# Patient Record
Sex: FEMALE | Race: WHITE | URBAN - METROPOLITAN AREA
[De-identification: names, ages, dates, MRNs, and addresses within clinical notes are randomized per-mention and may not be internally consistent; named-entity substitution may affect disease eponyms.]

---

## 2020-05-08 ENCOUNTER — VIRTUAL VISIT (OUTPATIENT)
Dept: FAMILY MEDICINE CLINIC | Age: 35
End: 2020-05-08

## 2020-05-08 DIAGNOSIS — R21 RASH: Primary | ICD-10-CM

## 2020-05-08 RX ORDER — ESCITALOPRAM OXALATE 10 MG/1
TABLET ORAL
COMMUNITY
Start: 2020-02-07 | End: 2020-05-08

## 2020-05-08 RX ORDER — TRIAMCINOLONE ACETONIDE 1 MG/G
CREAM TOPICAL 2 TIMES DAILY
Qty: 15 G | Refills: 0 | Status: SHIPPED | OUTPATIENT
Start: 2020-05-08 | End: 2022-02-17

## 2020-05-08 NOTE — PROGRESS NOTES
[address] Note  HPI:   Sabra Anderson is a 36 y.o. female who presents as a new patient with the following chief complaint. Chief Complaint   Patient presents with    Establish Care    Rash     I was in the office while conducting this encounter. Consent:  She and/or her healthcare decision maker is aware that this patient-initiated Telehealth encounter is a billable service, with coverage as determined by her insurance carrier. She is aware that she may receive a bill and has provided verbal consent to proceed: Yes    This virtual visit was conducted via Smart Cube. Pursuant to the emergency declaration under the [facility], Count includes the [facility] waiver authority and the [employer] Act, this Virtual  Visit was conducted to reduce the patient's risk of exposure to COVID-19 and provide continuity of care for an established patient. Services were provided through a video synchronous discussion virtually to substitute for in-person clinic visit. Due to this being a TeleHealth evaluation, many elements of the physical examination are unable to be assessed. Total Time: minutes: 5-10 minutes. Rash  Patient complains of rash involving the left arm. Rash started 2-3 weeks ago. Appearance of rash at onset: small red bumps. Rash has grown in size. Discomfort associated with rash: no discomfort associated with rash. Denies associated symptoms; Denies fever, cough, congestion, sore throat, headache, arthralgia, abdominal pain, nausea, vomiting, myalgia, crankiness, irritability, decrease in appetite, decrease in energy level. Patient has had previous evaluation of rash. Patient has not had previous treatment. Patient has not had contacts with similar rash. Patient has not identified precipitant. She did wear new metal bracelets a few weeks ago but is unsure if it is related .  Patient has not had new exposures (soaps, lotions, laundry detergents, foods, medications, plants, insects or animals.)      GYN: Gavi Sloan NP at [facility]- UTD on pap. No Known Allergies   There is no problem list on file for this patient. Past Medical History:   Diagnosis Date    Anxiety       Past Surgical History:   Procedure Laterality Date    HX ORTHOPAEDIC        No LMP recorded.    Family History   Problem Relation Age of Onset    Hypertension Mother     Other Mother         benign brain tumor    Hypertension Father     No Known Problems Sister     No Known Problems Brother     No Known Problems Daughter       Social History     Socioeconomic History    Marital status: SINGLE     Spouse name: Not on file    Number of children: Not on file    Years of education: Not on file    Highest education level: Not on file   Occupational History    Not on file   Social Needs    Financial resource strain: Not on file    Food insecurity     Worry: Not on file     Inability: Not on file   [employer] needs     Medical: Not on file     Non-medical: Not on file   Tobacco Use    Smoking status: Former Smoker     Packs/day: 0.25     Years: 2.00     Pack years: 0.50     Types: Cigarettes    Smokeless tobacco: Never Used   Substance and Sexual Activity    Alcohol use: Not Currently    Drug use: Not Currently     Types: Marijuana    Sexual activity: Not on file   Lifestyle    Physical activity     Days per week: Not on file     Minutes per session: Not on file    Stress: Not on file   Relationships    Social connections     Talks on phone: Not on file     Gets together: Not on file     Attends Gnosticism service: Not on file     Active member of club or organization: Not on file     Attends meetings of clubs or organizations: Not on file     Relationship status: Not on file    Intimate partner violence     Fear of current or ex partner: Not on file     Emotionally abused: Not on file     Physically abused: Not on file     Forced sexual activity: Not on file   Other Topics Concern    Not on file   Social History Narrative    Lives at home with daughter    Working at [employer]        ROS:   Review of Systems   Constitutional: Negative for chills, diaphoresis, fever, malaise/fatigue and weight loss. HENT: Negative for congestion, ear pain, hearing loss, sinus pain, sore throat and tinnitus. Eyes: Negative for blurred vision and double vision. Respiratory: Negative for shortness of breath. Cardiovascular: Negative for chest pain, palpitations and leg swelling. Gastrointestinal: Negative for abdominal pain, blood in stool, constipation, diarrhea, heartburn, melena, nausea and vomiting. Genitourinary: Negative for dysuria, frequency, hematuria and urgency. Musculoskeletal: Negative for joint pain and myalgias. Skin: Positive for rash. Negative for itching. Neurological: Negative for dizziness, tingling, weakness and headaches. Endo/Heme/Allergies: Negative for polydipsia. Psychiatric/Behavioral: Negative. Physical Exam:   There were no vitals taken for this visit. Vitals and Nurse Documentation reviewed. Physical Exam  Constitutional:       General: She is not in acute distress. Appearance: Normal appearance. She is well-developed, well-groomed and normal weight. She is not ill-appearing, toxic-appearing or diaphoretic. HENT:      Head: Normocephalic and atraumatic. Right Ear: Hearing normal.      Left Ear: Hearing normal.      Nose: No nasal deformity. Mouth/Throat:      Lips: Pink. No lesions. Mouth: Mucous membranes are moist.   Eyes:      General: Lids are normal.      Conjunctiva/sclera: Conjunctivae normal.      Right eye: Right conjunctiva is not injected. Left eye: Left conjunctiva is not injected. Pulmonary:      Effort: Pulmonary effort is normal.   Skin:         Neurological:      Mental Status: She is alert and oriented to person, place, and time. Psychiatric:         Mood and Affect: Mood normal.         Behavior: Behavior normal. Behavior is cooperative. Thought Content: Thought content normal.         Judgment: Judgment normal.           Assessment/ Plan:   [employer] were discussed including hours of operation, on-call providers, and MyChart. Diagnoses and all orders for this visit:    1. Rash: Small area of concern. Favor contact dermatitis. Start topical steroids. Advised follow-up in 1-2 weeks if no improvement. -     triamcinolone acetonide (KENALOG) 0.1 % topical cream; Apply  to affected area two (2) times a day. use thin layer to left arm    Advised follow-up for CPE at her convenience. Follow-up and Dispositions    · Return if symptoms worsen or fail to improve.         Discussed expected course/resolution/complications of diagnosis in detail with patient.    Medication risks/benefits/costs/interactions/alternatives discussed with patient.    Pt expressed understanding with the diagnosis and plan

## 2022-01-14 ENCOUNTER — OFFICE VISIT (OUTPATIENT)
Dept: INTERNAL MEDICINE CLINIC | Age: 37
End: 2022-01-14
Payer: COMMERCIAL

## 2022-01-14 VITALS
BODY MASS INDEX: 38.25 KG/M2 | RESPIRATION RATE: 16 BRPM | WEIGHT: 238 LBS | DIASTOLIC BLOOD PRESSURE: 90 MMHG | SYSTOLIC BLOOD PRESSURE: 120 MMHG | HEIGHT: 66 IN | TEMPERATURE: 98.2 F | OXYGEN SATURATION: 96 % | HEART RATE: 91 BPM

## 2022-01-14 DIAGNOSIS — N30.01 ACUTE CYSTITIS WITH HEMATURIA: ICD-10-CM

## 2022-01-14 DIAGNOSIS — E78.2 MIXED HYPERLIPIDEMIA: ICD-10-CM

## 2022-01-14 DIAGNOSIS — R03.0 ELEVATED BLOOD PRESSURE READING: Primary | ICD-10-CM

## 2022-01-14 DIAGNOSIS — E66.9 OBESITY (BMI 30-39.9): ICD-10-CM

## 2022-01-14 DIAGNOSIS — R10.13 EPIGASTRIC PAIN: ICD-10-CM

## 2022-01-14 DIAGNOSIS — Z00.00 ENCOUNTER FOR PREVENTIVE CARE: ICD-10-CM

## 2022-01-14 PROCEDURE — 99204 OFFICE O/P NEW MOD 45 MIN: CPT | Performed by: INTERNAL MEDICINE

## 2022-01-14 NOTE — PATIENT INSTRUCTIONS
DASH Diet: Care Instructions  Your Care Instructions     The DASH diet is an eating plan that can help lower your blood pressure. DASH stands for Dietary Approaches to Stop Hypertension. Hypertension is high blood pressure. The DASH diet focuses on eating foods that are high in calcium, potassium, and magnesium. These nutrients can lower blood pressure. The foods that are highest in these nutrients are fruits, vegetables, low-fat dairy products, nuts, seeds, and legumes. But taking calcium, potassium, and magnesium supplements instead of eating foods that are high in those nutrients does not have the same effect. The DASH diet also includes whole grains, fish, and poultry. The DASH diet is one of several lifestyle changes your doctor may recommend to lower your high blood pressure. Your doctor may also want you to decrease the amount of sodium in your diet. Lowering sodium while following the DASH diet can lower blood pressure even further than just the DASH diet alone. Follow-up care is a key part of your treatment and safety. Be sure to make and go to all appointments, and call your doctor if you are having problems. It's also a good idea to know your test results and keep a list of the medicines you take. How can you care for yourself at home? Following the DASH diet  · Eat 4 to 5 servings of fruit each day. A serving is 1 medium-sized piece of fruit, ½ cup chopped or canned fruit, 1/4 cup dried fruit, or 4 ounces (½ cup) of fruit juice. Choose fruit more often than fruit juice. · Eat 4 to 5 servings of vegetables each day. A serving is 1 cup of lettuce or raw leafy vegetables, ½ cup of chopped or cooked vegetables, or 4 ounces (½ cup) of vegetable juice. Choose vegetables more often than vegetable juice. · Get 2 to 3 servings of low-fat and fat-free dairy each day. A serving is 8 ounces of milk, 1 cup of yogurt, or 1 ½ ounces of cheese. · Eat 6 to 8 servings of grains each day.  A serving is 1 slice of bread, 1 ounce of dry cereal, or ½ cup of cooked rice, pasta, or cooked cereal. Try to choose whole-grain products as much as possible. · Limit lean meat, poultry, and fish to 2 servings each day. A serving is 3 ounces, about the size of a deck of cards. · Eat 4 to 5 servings of nuts, seeds, and legumes (cooked dried beans, lentils, and split peas) each week. A serving is 1/3 cup of nuts, 2 tablespoons of seeds, or ½ cup of cooked beans or peas. · Limit fats and oils to 2 to 3 servings each day. A serving is 1 teaspoon of vegetable oil or 2 tablespoons of salad dressing. · Limit sweets and added sugars to 5 servings or less a week. A serving is 1 tablespoon jelly or jam, ½ cup sorbet, or 1 cup of lemonade. · Eat less than 2,300 milligrams (mg) of sodium a day. If you limit your sodium to 1,500 mg a day, you can lower your blood pressure even more. · Be aware that all of these are the suggested number of servings for people who eat 1,800 to 2,000 calories a day. Your recommended number of servings may be different if you need more or fewer calories. Tips for success  · Start small. Do not try to make dramatic changes to your diet all at once. You might feel that you are missing out on your favorite foods and then be more likely to not follow the plan. Make small changes, and stick with them. Once those changes become habit, add a few more changes. · Try some of the following:  ? Make it a goal to eat a fruit or vegetable at every meal and at snacks. This will make it easy to get the recommended amount of fruits and vegetables each day. ? Try yogurt topped with fruit and nuts for a snack or healthy dessert. ? Add lettuce, tomato, cucumber, and onion to sandwiches. ? Combine a ready-made pizza crust with low-fat mozzarella cheese and lots of vegetable toppings. Try using tomatoes, squash, spinach, broccoli, carrots, cauliflower, and onions. ?  Have a variety of cut-up vegetables with a low-fat dip as an appetizer instead of chips and dip. ? Sprinkle sunflower seeds or chopped almonds over salads. Or try adding chopped walnuts or almonds to cooked vegetables. ? Try some vegetarian meals using beans and peas. Add garbanzo or kidney beans to salads. Make burritos and tacos with mashed cox beans or black beans. Where can you learn more? Go to http://www.pederson.com/  Enter H967 in the search box to learn more about \"DASH Diet: Care Instructions. \"  Current as of: April 29, 2021               Content Version: 13.0  © 3278-3313 [employer]. Care instructions adapted under license by [employer] (which disclaims liability or warranty for this information). If you have questions about a medical condition or this instruction, always ask your healthcare professional. [address] any warranty or liability for your use of this information.

## 2022-01-14 NOTE — Clinical Note
Please obtain medical records from [facility] [place]: need last sets of labs, last provider note.

## 2022-01-14 NOTE — PROGRESS NOTES
1. Have you been to the ER, urgent care clinic since your last visit? Hospitalized since your last visit? No    2. Have you seen or consulted any other health care providers outside of the [address] [place] [address] since your last visit? Include any pap smears or colon screening.  No   Chief Complaint   Patient presents with   [address] Maintenance Due   Topic Date Due    Hepatitis C Screening  Never done    COVID-19 Vaccine (1) Never done    DTaP/Tdap/Td series (1 - Tdap) Never done    Cervical cancer screen  Never done    Flu Vaccine (1) Never done

## 2022-01-14 NOTE — PROGRESS NOTES
Danay Camargo is a 29 y.o. female  Chief Complaint   Patient presents with    Establish Care     Visit Vitals  BP (!) 120/90   Pulse 91   Temp 98.2 °F (36.8 °C) (Temporal)   Resp 16   Ht 5' 6\" (1.676 m)   Wt 238 lb (108 kg)   SpO2 96%   BMI 38.41 kg/m²          HPI  Ms. Tom Johnston presents to the clinic to establish care. Her primary concern today is periumblical abdominal discomfort and stool irregularity that started about a month ago. The discomfort is vague/aching and associated with flank pain and triggered by meals and has to fast for over 24 hours to avoid pain. She reports that her stool shape appeared to be irregular shaped with ragged/flat rather than nicely round. She also reports her stool is smells like the canned chicken she ate about a month ago and her daughter had similar symptoms. Patient was seen at ob-gyn office who told her that her triglycerides and insulin levels are elevated and she was started on metformin. She endorses an episode of vomiting recently and she reports intolerance to certain foods and wants to get food allergy testing. Patient also reports halitosis and feeling of food getting stuck in her throat, endorses heat burn. Patient lost about 7 pounds in 2 month intentionally by eating low calorie diet, but hasn't started exercising yet. 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 Smokes 1-2 black and mild a day and plans to quit. Her last pap smear was normal and she had covid vaccine and she will get flue vaccine. Past Medical History:   Diagnosis Date    Anxiety       Allergies   Allergen Reactions    Amoclan [Amoxicillin-Pot Clavulanate] Other (comments)     Yeast infection            ROS  Review of Systems   All other systems reviewed and are negative. EXAM  Physical Exam  Constitutional:       General: She is not in acute distress. Appearance: Normal appearance. HENT:      Head: Normocephalic and atraumatic. Mouth/Throat:      Mouth: Mucous membranes are moist.      Pharynx: Oropharynx is clear. Eyes:      Extraocular Movements: Extraocular movements intact. Cardiovascular:      Rate and Rhythm: Normal rate and regular rhythm. Heart sounds: No murmur heard. No gallop. Pulmonary:      Effort: No respiratory distress. Breath sounds: Normal breath sounds. No wheezing or rales. Abdominal:      General: Bowel sounds are normal. There is no distension. Palpations: There is no mass. Tenderness: There is abdominal tenderness. There is no guarding. Comments: Mild tenderness   Musculoskeletal:         General: No swelling, tenderness or deformity. Normal range of motion. Right lower leg: No edema. Left lower leg: No edema. Skin:     Findings: No lesion. Neurological:      General: No focal deficit present. Mental Status: She is alert and oriented to person, place, and time. Motor: No weakness. Psychiatric:         Mood and Affect: Mood normal.         Health Maintenance Due   Topic Date Due    Hepatitis C Screening  Never done    COVID-19 Vaccine (1) Never done    DTaP/Tdap/Td series (1 - Tdap) Never done    Cervical cancer screen  Never done    Flu Vaccine (1) Never done     ASSESSMENT/PLAN  Diagnoses and all orders for this visit:    1. Elevated blood pressure reading    2. Encounter for preventive care    3. Epigastric pain  -     US ABD LTD; Future  -     OVA & PARASITES, STOOL; Future  -     H PYLORI AG, STOOL; Future  -     URINALYSIS W/ RFLX MICROSCOPIC; Future  -     LIPASE; Future  -     CELIAC ANTIBODY PROFILE; Future  -     ALLERGEN PANEL, FOOD, BASIC; Future    4. Mixed hyperlipidemia  -     LIPID PANEL;  Future      Abdominal pain/food intolerance   -will obtain RUQ ultrasound, cbc, cmp and lipase , H. Pylori, stool ova and parasite.  -will get food allergy and celiac antibody testing      Elevated blood pressure reading   -monitor blood pressure at home   -advised on DASH diet  -regular exercise 50 minutes a day for 3-4 x/week  -low calorie diet for weight loss     Obesity  -patient is seeing a progress in weight loss, lost about 7lbs in a few weeks by adopting low calorie diet and plant based diet and plans to start exercising.   -encouraged exercise   -she is currently on metformin from ob-gyn office   -will get A1c and routine labs and get records from [facility]    HLD  -she was told she has elevated triglyceride but she didn't take it after fasting   -will get fasting lipid profile   -advised on limiting the amount of saturated fats and trans fats  -will advise further after lipid profile     Preventive visit   -has ob-gyn, normal pap smears   -had covid vaccine, to get booster and flue vaccine at her pharmacy   -encouraged exercise and healthy dietary habits        Pablito Rowe MD

## 2022-01-17 RX ORDER — CEFDINIR 300 MG/1
300 CAPSULE ORAL 2 TIMES DAILY
Qty: 10 CAPSULE | Refills: 0 | Status: SHIPPED | OUTPATIENT
Start: 2022-01-17 | End: 2022-01-22

## 2022-01-19 LAB
H PYLORI AG STL QL IA: NEGATIVE
O+P SPEC MICRO: NORMAL
O+P STL CONC: NORMAL
SPECIMEN SOURCE: NORMAL
SPECIMEN SOURCE: NORMAL

## 2022-02-17 ENCOUNTER — OFFICE VISIT (OUTPATIENT)
Dept: INTERNAL MEDICINE CLINIC | Age: 37
End: 2022-02-17
Payer: COMMERCIAL

## 2022-02-17 VITALS
OXYGEN SATURATION: 97 % | RESPIRATION RATE: 20 BRPM | DIASTOLIC BLOOD PRESSURE: 111 MMHG | HEART RATE: 97 BPM | SYSTOLIC BLOOD PRESSURE: 162 MMHG | HEIGHT: 66 IN | BODY MASS INDEX: 38.89 KG/M2 | WEIGHT: 242 LBS | TEMPERATURE: 97.9 F

## 2022-02-17 DIAGNOSIS — R31.9 HEMATURIA, UNSPECIFIED TYPE: ICD-10-CM

## 2022-02-17 DIAGNOSIS — R03.0 ELEVATED BLOOD PRESSURE READING: ICD-10-CM

## 2022-02-17 DIAGNOSIS — R73.09 ELEVATED GLUCOSE: Primary | ICD-10-CM

## 2022-02-17 DIAGNOSIS — R80.9 PROTEINURIA, UNSPECIFIED TYPE: ICD-10-CM

## 2022-02-17 LAB
BILIRUB UR QL STRIP: NEGATIVE
GLUCOSE UR-MCNC: NEGATIVE MG/DL
KETONES P FAST UR STRIP-MCNC: NEGATIVE MG/DL
PH UR STRIP: 5.5 [PH] (ref 4.6–8)
PROT UR QL STRIP: NORMAL
SP GR UR STRIP: 1.03 (ref 1–1.03)
UA UROBILINOGEN AMB POC: NORMAL (ref 0.2–1)
URINALYSIS CLARITY POC: NORMAL
URINALYSIS COLOR POC: NORMAL
URINE BLOOD POC: NORMAL
URINE LEUKOCYTES POC: NEGATIVE
URINE NITRITES POC: NEGATIVE

## 2022-02-17 PROCEDURE — 99213 OFFICE O/P EST LOW 20 MIN: CPT | Performed by: PHYSICIAN ASSISTANT

## 2022-02-17 PROCEDURE — 81002 URINALYSIS NONAUTO W/O SCOPE: CPT | Performed by: PHYSICIAN ASSISTANT

## 2022-02-17 NOTE — PROGRESS NOTES
1. Have you been to the ER, urgent care clinic since your last visit? Hospitalized since your last visit? No    2. Have you seen or consulted any other health care providers outside of the [address] [place] [address] since your last visit? Include any pap smears or colon screening. No      Health Maintenance Due   Topic Date Due    Hepatitis C Screening  Never done    COVID-19 Vaccine (1) Never done    DTaP/Tdap/Td series (1 - Tdap) Never done    Flu Vaccine (1) Never done     Patient having urinary frequency and odor, patient also states having a discharge light yellow, odor since 1/14. Patient was on an antibiotic. Patient states experiencing abdominal/bladder pain due to holding urine, better after voiding.

## 2022-02-17 NOTE — PROGRESS NOTES
Chief Complaint   Patient presents with    Urinary Frequency    Urinary Odor    Yeast Infection     she is a 29y.o. year old female who presents for evaluation. The patient presents for follow up. She states she was recently treated for a uti. At that time she was having rectal type pain and abdominal pian. She was treated for a uti and those symptoms resolved. She reports the only time she has abdominal pain is when her bladder is full. Once she has gone to the bathroom the pain goes away. Reviewed PmHx, RxHx, FmHx, SocHx, AllgHx and updated and dated in the chart. Review of Systems - negative except as listed above    Objective:     Vitals:    02/17/22 0932 02/17/22 1001   BP: (!) 147/107 (!) 162/111   Pulse: 94 97   Resp: 20    Temp: 97.9 °F (36.6 °C)    TempSrc: Temporal    SpO2: 97%    Weight: 242 lb (109.8 kg)    Height: 5' 6\" (1.676 m)      Physical Examination: General appearance - alert, well appearing, and in no distress  Mental status - normal mood, behavior, speech, dress, motor activity, and thought processes  Abdomen - soft, nontender, nondistended, no masses or organomegaly    Assessment/ Plan:   Diagnoses and all orders for this visit:    1. Elevated glucose  -     HEMOGLOBIN A1C WITH EAG; Future  -     CULTURE, URINE; Future  -     AMB POC URINALYSIS DIP STICK MANUAL W/O MICRO    2. Proteinuria, unspecified type  -     METABOLIC PANEL, COMPREHENSIVE; Future  -     CULTURE, URINE; Future  -     AMB POC URINALYSIS DIP STICK MANUAL W/O MICRO    3. Hematuria, unspecified type  -     METABOLIC PANEL, COMPREHENSIVE; Future  -     CULTURE, URINE; Future  -     AMB POC URINALYSIS DIP STICK MANUAL W/O MICRO     I would like for the patient to get some labs done. Today her urine was positive for blood and protein but no leukocytes or nitrates. Her urine will be sent for a culture. Also the patient's blood pressure was elevated.  She is going to get a blood pressure cuff for home and check it daily. She is scheduled to see Dr. Fred Pineda in a couple of weeks. I have asked her to bring these readings to him. I explained to her we will need to figure out why she is has the protein and blood still in her urine. I have discussed the diagnosis with the patient and the intended plan as seen in the above orders. The patient has received an after-visit summary and questions were answered concerning future plans.      Medication Side Effects and Warnings were discussed with patient: n/a  Patient Labs were reviewed and or requested: yes  Patient Past Records were reviewed and or requested  yes    Yodit Fuentes PA-C

## 2022-02-19 LAB
BACTERIA SPEC CULT: ABNORMAL
BACTERIA SPEC CULT: ABNORMAL
CC UR VC: ABNORMAL
SERVICE CMNT-IMP: ABNORMAL

## 2022-02-22 ENCOUNTER — TELEPHONE (OUTPATIENT)
Dept: INTERNAL MEDICINE CLINIC | Age: 37
End: 2022-02-22

## 2022-02-22 DIAGNOSIS — N30.01 ACUTE CYSTITIS WITH HEMATURIA: Primary | ICD-10-CM

## 2022-02-22 RX ORDER — AZITHROMYCIN 250 MG/1
TABLET, FILM COATED ORAL
Qty: 6 TABLET | Refills: 0 | Status: SHIPPED | OUTPATIENT
Start: 2022-02-22 | End: 2022-03-02 | Stop reason: ALTCHOICE

## 2022-02-22 RX ORDER — FLUCONAZOLE 150 MG/1
150 TABLET ORAL DAILY
Qty: 1 TABLET | Refills: 1 | Status: SHIPPED | OUTPATIENT
Start: 2022-02-22 | End: 2022-02-23

## 2022-02-22 NOTE — TELEPHONE ENCOUNTER
Diflucan has been sent to the pharmacy for use if she develops a yeast infection post treatment for a uti

## 2022-03-02 ENCOUNTER — OFFICE VISIT (OUTPATIENT)
Dept: INTERNAL MEDICINE CLINIC | Age: 37
End: 2022-03-02
Payer: COMMERCIAL

## 2022-03-02 VITALS
BODY MASS INDEX: 38.6 KG/M2 | HEIGHT: 66 IN | DIASTOLIC BLOOD PRESSURE: 88 MMHG | OXYGEN SATURATION: 99 % | HEART RATE: 109 BPM | RESPIRATION RATE: 14 BRPM | WEIGHT: 240.2 LBS | SYSTOLIC BLOOD PRESSURE: 131 MMHG | TEMPERATURE: 98.2 F

## 2022-03-02 DIAGNOSIS — R80.9 PROTEINURIA, UNSPECIFIED TYPE: ICD-10-CM

## 2022-03-02 DIAGNOSIS — R35.0 URINARY FREQUENCY: Primary | ICD-10-CM

## 2022-03-02 DIAGNOSIS — R31.9 HEMATURIA, UNSPECIFIED TYPE: ICD-10-CM

## 2022-03-02 PROCEDURE — 99213 OFFICE O/P EST LOW 20 MIN: CPT | Performed by: INTERNAL MEDICINE

## 2022-03-02 RX ORDER — ESCITALOPRAM OXALATE 10 MG/1
10 TABLET ORAL DAILY
COMMUNITY
Start: 2022-02-18 | End: 2022-09-14 | Stop reason: SDUPTHER

## 2022-03-02 RX ORDER — SPIRONOLACTONE 100 MG/1
100 TABLET, FILM COATED ORAL DAILY
COMMUNITY
Start: 2022-02-18 | End: 2022-03-17 | Stop reason: ALTCHOICE

## 2022-03-02 NOTE — PROGRESS NOTES
Reviewed record in preparation for visit and have obtained necessary documentation. Identified pt with two pt identifiers(name and ). Chief Complaint   Patient presents with    Follow-up     Vitals:    22 0939   BP: 131/88   Pulse: (!) 109   Resp: (P) 14   Temp: (P) 98.2 °F (36.8 °C)   TempSrc: (P) Temporal   SpO2: 99%   Weight: (P) 240 lb 3.2 oz (109 kg)   Height: (P) 5' 6\" (1.676 m)        Health Maintenance Due   Topic Date Due    Hepatitis C Test  Never done    COVID-19 Vaccine (1) Never done    DTaP/Tdap/Td  (1 - Tdap) Never done    Yearly Flu Vaccine (1) Never done       Ms. Kaity Cornejo has a reminder for a \"due or due soon\" health maintenance. I have asked that she discuss this further with her primary care provider for follow-up on this health maintenance. Coordination of Care Questionnaire:  :     1) Have you been to an emergency room, urgent care clinic since your last visit? no   Hospitalized since your last visit? no             2) Have you seen or consulted any other health care providers outside of [address] [place] [address] since your last visit? no  (Include any pap smears or colon screenings in this section.)    3) In the event something were to happen to you and you were unable to speak on your behalf, do you have an Advance Directive/ Living Will in place stating your wishes? NO    Do you have an Advance Directive on file? no    4) Are you interested in receiving information on Advance Directives? No    Patient is accompanied by self I have received verbal consent from Wang Yeboah to discuss any/all medical information while they are present in the room.

## 2022-03-02 NOTE — PROGRESS NOTES
Truong Rankin is a 29 y.o. female  Chief Complaint   Patient presents with    Follow-up     Visit Vitals  /88 (BP 1 Location: Left upper arm, BP Patient Position: Sitting, BP Cuff Size: Adult)   Pulse (!) 109   Temp 98.2 °F (36.8 °C) (Temporal)   Resp 14   Ht 5' 6\" (1.676 m)   Wt 240 lb 3.2 oz (109 kg)   SpO2 99%   BMI 38.77 kg/m²          HPI  Ms. David Medrano presents for follow up on proteinuria and UTI. She was last seen with GI complaints and her symptoms has improved. Patient was seen for Urinary symptoms and found to have UTI and she is here today to see if she has cleared the infection and proteinuria because she continues to have frequency. Denies dysuria, fever, chills, abdominal pain, flank pain. Her blood pressure has improved on this viist and weight is stable. Past Medical History:   Diagnosis Date    Anxiety       Allergies   Allergen Reactions    Amoclan [Amoxicillin-Pot Clavulanate] Other (comments)     Yeast infection            ROS  Review of Systems   All other systems reviewed and are negative. EXAM  Physical Exam  HENT:      Head: Normocephalic and atraumatic. Cardiovascular:      Rate and Rhythm: Normal rate and regular rhythm. Pulses: Normal pulses. Heart sounds: Normal heart sounds. No murmur heard. Abdominal:      General: There is no distension. Tenderness: There is no abdominal tenderness. There is no right CVA tenderness or left CVA tenderness. Health Maintenance Due   Topic Date Due    Hepatitis C Screening  Never done    COVID-19 Vaccine (1) Never done    DTaP/Tdap/Td series (1 - Tdap) Never done    Flu Vaccine (1) Never done     ASSESSMENT/PLAN    Diagnoses and all orders for this visit:    1. Urinary frequency  -     URINALYSIS W/ REFLEX CULTURE; Future  -     CREATININE, UR, RANDOM; Future  -     PROTEIN URINE, RANDOM; Future    2. Proteinuria, unspecified type  -     CREATININE, UR, RANDOM;  Future  -     PROTEIN URINE, RANDOM; Future        Wray Homans, MD

## 2022-03-04 RX ORDER — NITROFURANTOIN 25; 75 MG/1; MG/1
100 CAPSULE ORAL 2 TIMES DAILY
Qty: 10 CAPSULE | Refills: 0 | Status: SHIPPED | OUTPATIENT
Start: 2022-03-04 | End: 2022-03-09

## 2022-03-04 RX ORDER — FLUCONAZOLE 150 MG/1
150 TABLET ORAL DAILY
Qty: 1 TABLET | Refills: 0 | Status: SHIPPED | OUTPATIENT
Start: 2022-03-04 | End: 2022-03-05

## 2022-03-16 ENCOUNTER — OFFICE VISIT (OUTPATIENT)
Dept: INTERNAL MEDICINE CLINIC | Age: 37
End: 2022-03-16
Payer: COMMERCIAL

## 2022-03-16 VITALS
WEIGHT: 240.2 LBS | BODY MASS INDEX: 38.6 KG/M2 | RESPIRATION RATE: 14 BRPM | HEART RATE: 100 BPM | HEIGHT: 66 IN | DIASTOLIC BLOOD PRESSURE: 101 MMHG | TEMPERATURE: 98.4 F | SYSTOLIC BLOOD PRESSURE: 135 MMHG | OXYGEN SATURATION: 93 %

## 2022-03-16 DIAGNOSIS — E78.1 HYPERTRIGLYCERIDEMIA: Primary | ICD-10-CM

## 2022-03-16 DIAGNOSIS — R80.9 PROTEINURIA, UNSPECIFIED TYPE: ICD-10-CM

## 2022-03-16 PROCEDURE — 99214 OFFICE O/P EST MOD 30 MIN: CPT | Performed by: INTERNAL MEDICINE

## 2022-03-16 NOTE — PROGRESS NOTES
Reviewed record in preparation for visit and have obtained necessary documentation. Identified pt with two pt identifiers(name and ). Chief Complaint   Patient presents with    Follow-up     Pt following up on kidney and urine function      Vitals:    22 1057   BP: (!) 135/101   Pulse: 100   Resp: 14   Temp: 98.4 °F (36.9 °C)   TempSrc: Temporal   SpO2: 93%   Weight: 240 lb 3.2 oz (109 kg)   Height: 5' 6\" (1.676 m)        Health Maintenance Due   Topic Date Due    Hepatitis C Test  Never done    COVID-19 Vaccine (1) Never done    DTaP/Tdap/Td  (1 - Tdap) Never done    Yearly Flu Vaccine (1) Never done       Ms. Yolanda Serrano has a reminder for a \"due or due soon\" health maintenance. I have asked that she discuss this further with her primary care provider for follow-up on this health maintenance. Coordination of Care Questionnaire:  :     1) Have you been to an emergency room, urgent care clinic since your last visit? no   Hospitalized since your last visit? no             2) Have you seen or consulted any other health care providers outside of [address] [place] [address] since your last visit? yes  (Include any pap smears or colon screenings in this section.) [facility]     3) In the event something were to happen to you and you were unable to speak on your behalf, do you have an Advance Directive/ Living Will in place stating your wishes? NO    Do you have an Advance Directive on file? no    4) Are you interested in receiving information on Advance Directives? Patient is accompanied by self I have received verbal consent from Shanta Gil to discuss any/all medical information while they are present in the room.

## 2022-03-16 NOTE — PROGRESS NOTES
Leesa Hough is a 29 y.o. female  Chief Complaint   Patient presents with    Follow-up     Pt following up on kidney and urine function      Visit Vitals  BP (!) 135/101 (BP 1 Location: Left upper arm, BP Patient Position: Sitting, BP Cuff Size: Adult)   Pulse 100   Temp 98.4 °F (36.9 °C) (Temporal)   Resp 14   Ht 5' 6\" (1.676 m)   Wt 240 lb 3.2 oz (109 kg)   SpO2 93%   BMI 38.77 kg/m²          HPI  Ms. Liusana Donahue presents here to follow up on her proteinuria and hypertriglyceridemia. She was seen by [facility] and her A1c was 5.5,  Albumin to creatinine ratio of 581, insulin 112 and triglyceride of 851 and she was referred to PCP for management of hypertriglyceridemia. Patient is on metformin 850 BID for PCOS. Patient is doing generally fine, she has completed antibiotics and will get UA. Vearl Pippins Past Medical History:   Diagnosis Date    Anxiety       Allergies   Allergen Reactions    Amoclan [Amoxicillin-Pot Clavulanate] Other (comments)     Yeast infection            ROS  Review of Systems   Constitutional: Negative for chills, fever and weight loss. HENT: Negative for hearing loss. Eyes: Negative for blurred vision, double vision and photophobia. Respiratory: Negative for cough and hemoptysis. Cardiovascular: Negative for chest pain and palpitations. Gastrointestinal: Negative for heartburn and nausea. Genitourinary: Negative for dysuria, frequency and urgency. Musculoskeletal: Negative for myalgias. Skin: Negative for rash. Neurological: Negative for dizziness and headaches. Endo/Heme/Allergies: Negative for environmental allergies. Does not bruise/bleed easily. Psychiatric/Behavioral: Negative for depression. EXAM  Physical Exam  Cardiovascular:      Rate and Rhythm: Normal rate and regular rhythm. Heart sounds: Normal heart sounds. No murmur heard. No gallop. Pulmonary:      Breath sounds: Normal breath sounds.    Abdominal:      Tenderness: There is no right CVA tenderness or left CVA tenderness. Health Maintenance Due   Topic Date Due    Hepatitis C Screening  Never done    COVID-19 Vaccine (1) Never done    DTaP/Tdap/Td series (1 - Tdap) Never done    Flu Vaccine (1) Never done     ASSESSMENT/PLAN  Diagnoses and all orders for this visit:    1. Hypertriglyceridemia  -     LIPID PANEL; Future    2. Proteinuria, unspecified type  -     CREATININE, UR, RANDOM; Future  -     PROTEIN URINE, RANDOM; Future  -     PROTEIN ELECTROPHORESIS, URINE RANDOM; Future  -     URINALYSIS W/ REFLEX CULTURE; Future    Other orders  -     omega-3 acid ethyl esters (LOVAZA) 1 gram capsule; Take 2 Capsules by mouth two (2) times a day for 90 days. Encouraged exercise, low calorie diet and reduce the proportion of calories from fats to less than 30%. She doesn't feel ready for medical management and because her labs with triglyceride of 891 was non-fasting, will get fasting triglyceride levels and determine whether she needs medication or not.   Will get UPEP and repeat UA and UACR    Miguel Angel Stewart MD

## 2022-03-17 RX ORDER — OMEGA-3-ACID ETHYL ESTERS 1 G/1
2 CAPSULE, LIQUID FILLED ORAL 2 TIMES DAILY
Qty: 360 CAPSULE | Refills: 0 | Status: SHIPPED | OUTPATIENT
Start: 2022-03-17 | End: 2022-06-15

## 2022-03-17 RX ORDER — LISINOPRIL 5 MG/1
5 TABLET ORAL DAILY
Qty: 30 TABLET | Refills: 2 | Status: SHIPPED | OUTPATIENT
Start: 2022-03-17 | End: 2022-09-21 | Stop reason: SDUPTHER

## 2022-03-19 PROBLEM — E78.2 MIXED HYPERLIPIDEMIA: Status: ACTIVE | Noted: 2022-01-14

## 2022-03-19 PROBLEM — R80.9 PROTEINURIA, UNSPECIFIED TYPE: Status: ACTIVE | Noted: 2022-03-02

## 2022-03-25 ENCOUNTER — OFFICE VISIT (OUTPATIENT)
Dept: INTERNAL MEDICINE CLINIC | Age: 37
End: 2022-03-25
Payer: COMMERCIAL

## 2022-03-25 VITALS
HEIGHT: 66 IN | BODY MASS INDEX: 37.77 KG/M2 | DIASTOLIC BLOOD PRESSURE: 85 MMHG | TEMPERATURE: 98 F | SYSTOLIC BLOOD PRESSURE: 110 MMHG | WEIGHT: 235 LBS | RESPIRATION RATE: 20 BRPM | OXYGEN SATURATION: 99 % | HEART RATE: 89 BPM

## 2022-03-25 DIAGNOSIS — N89.8 VAGINAL DISCHARGE: Primary | ICD-10-CM

## 2022-03-25 DIAGNOSIS — R10.9 FLANK PAIN: ICD-10-CM

## 2022-03-25 DIAGNOSIS — R31.9 HEMATURIA, UNSPECIFIED TYPE: ICD-10-CM

## 2022-03-25 PROBLEM — E66.9 OBESITY: Status: ACTIVE | Noted: 2019-06-12

## 2022-03-25 PROBLEM — S92.302A: Status: ACTIVE | Noted: 2019-10-03

## 2022-03-25 LAB
BILIRUB UR QL STRIP: NEGATIVE
GLUCOSE UR-MCNC: NEGATIVE MG/DL
KETONES P FAST UR STRIP-MCNC: NEGATIVE MG/DL
PH UR STRIP: 5.5 [PH] (ref 4.6–8)
PROT UR QL STRIP: NORMAL
SP GR UR STRIP: 1.03 (ref 1–1.03)
UA UROBILINOGEN AMB POC: NORMAL (ref 0.2–1)
URINALYSIS CLARITY POC: NORMAL
URINALYSIS COLOR POC: YELLOW
URINE BLOOD POC: NORMAL
URINE LEUKOCYTES POC: NEGATIVE
URINE NITRITES POC: NEGATIVE

## 2022-03-25 PROCEDURE — 99213 OFFICE O/P EST LOW 20 MIN: CPT | Performed by: INTERNAL MEDICINE

## 2022-03-25 PROCEDURE — 81002 URINALYSIS NONAUTO W/O SCOPE: CPT | Performed by: INTERNAL MEDICINE

## 2022-03-25 NOTE — PROGRESS NOTES
Viky Brady is a 29 y.o. female  Chief Complaint   Patient presents with    Urinary Frequency    Vaginal Discharge       Visit Vitals  /85   Pulse 89   Temp 98 °F (36.7 °C) (Temporal)   Resp 20   Ht 5' 6\" (1.676 m)   Wt 235 lb (106.6 kg)   SpO2 99%   BMI 37.93 kg/m²          HPI  Ms. Sabiha Hamilton returns for symptoms of urinary frequency, right flank pain and foul smelling cloudy urinary discharge. Patient is concerned that she may have UTI and she has recurrent UTIs and hematuria/proteinuria. She denies fever, chills, nausea, vomiting. UA showed hematuria and proteinuria. .  Past Medical History:   Diagnosis Date    Anxiety       Allergies   Allergen Reactions    Amoclan [Amoxicillin-Pot Clavulanate] Other (comments)     Yeast infection            ROS  Review of Systems   All other systems reviewed and are negative. EXAM  Physical Exam  Vitals reviewed. Constitutional:       Appearance: Normal appearance. Cardiovascular:      Rate and Rhythm: Normal rate and regular rhythm. Heart sounds: Normal heart sounds. No murmur heard. Pulmonary:      Effort: Pulmonary effort is normal. No respiratory distress. Breath sounds: Normal breath sounds. Abdominal:      General: There is no distension. Tenderness: There is no right CVA tenderness or left CVA tenderness. Neurological:      Mental Status: She is alert. Psychiatric:         Mood and Affect: Mood normal.         Health Maintenance Due   Topic Date Due    Hepatitis C Screening  Never done    COVID-19 Vaccine (1) Never done    DTaP/Tdap/Td series (1 - Tdap) Never done    Flu Vaccine (1) Never done     ASSESSMENT/PLAN  Diagnoses and all orders for this visit:    1. Vaginal discharge  -     CT/NG/T.VAGINALIS AMPLIFICATION; Future    2. Flank pain  -     CT ABDOMEN W WO CONT AND PELVIS W CONT; Future    3.  Hematuria, unspecified type  -     CT ABDOMEN W WO CONT AND PELVIS W CONT; Future  -     AMB POC URINALYSIS DIP Desiree Villatoro MD

## 2022-03-25 NOTE — PROGRESS NOTES
1. \"Have you been to the ER, urgent care clinic since your last visit? Hospitalized since your last visit? \" No    2. \"Have you seen or consulted any other health care providers outside of the [address] [place] [address] since your last visit? \" No    3. For patients aged 39-70: Has the patient had a colonoscopy / FIT/ Cologuard? no      If the patient is female:    4. For patients aged 41-77: Has the patient had a mammogram within the past 2 years? no      5. For patients aged 21-65: Has the patient had a pap smear? no    Health Maintenance Due   Topic Date Due    Hepatitis C Screening  Never done    COVID-19 Vaccine (1) Never done    DTaP/Tdap/Td series (1 - Tdap) Never done    Flu Vaccine (1) Never done     Patient c/o urinary frequency, patient c/o vaginal discharge with strong sweet odor.

## 2022-03-27 LAB
C TRACH RRNA SPEC QL NAA+PROBE: NEGATIVE
N GONORRHOEA RRNA SPEC QL NAA+PROBE: NEGATIVE
SPECIMEN SOURCE: NORMAL
T VAGINALIS RRNA VAG QL NAA+PROBE: NEGATIVE

## 2022-03-28 RX ORDER — FENOFIBRATE 48 MG/1
48 TABLET, COATED ORAL DAILY
Qty: 30 TABLET | Refills: 2 | Status: SHIPPED | OUTPATIENT
Start: 2022-03-28 | End: 2022-06-26

## 2022-09-12 ENCOUNTER — TELEPHONE (OUTPATIENT)
Dept: INTERNAL MEDICINE CLINIC | Age: 37
End: 2022-09-12

## 2022-09-15 RX ORDER — ESCITALOPRAM OXALATE 10 MG/1
10 TABLET ORAL DAILY
Qty: 90 TABLET | Refills: 3 | Status: SHIPPED | OUTPATIENT
Start: 2022-09-15

## 2022-09-21 ENCOUNTER — TELEPHONE (OUTPATIENT)
Dept: INTERNAL MEDICINE CLINIC | Age: 37
End: 2022-09-21

## 2022-09-21 RX ORDER — LISINOPRIL 5 MG/1
5 TABLET ORAL DAILY
Qty: 30 TABLET | Refills: 2 | Status: SHIPPED | OUTPATIENT
Start: 2022-09-21 | End: 2022-09-22 | Stop reason: SDUPTHER

## 2022-09-21 NOTE — TELEPHONE ENCOUNTER
Pt is requesting refill for LISINOPRIL 5 MG TAB. This medication is shown on a list of medications that pt use to take.

## 2022-09-22 RX ORDER — LISINOPRIL 5 MG/1
5 TABLET ORAL DAILY
Qty: 30 TABLET | Refills: 2 | Status: SHIPPED | OUTPATIENT
Start: 2022-09-22

## 2022-09-22 NOTE — TELEPHONE ENCOUNTER
Future Appointments:  No future appointments. Last Appointment With Me:  Visit date not found     Requested Prescriptions     Pending Prescriptions Disp Refills    lisinopriL (PRINIVIL, ZESTRIL) 5 mg tablet 30 Tablet 2     Sig: Take 1 Tablet by mouth daily.

## 2022-09-22 NOTE — TELEPHONE ENCOUNTER
Pharmacy Comments:      Pt is looking for RX for LISINOPRIL- has been out for two weeks   Pt states /100

## 2023-08-16 ENCOUNTER — NURSE TRIAGE (OUTPATIENT)
Dept: OTHER | Facility: CLINIC | Age: 38
End: 2023-08-16

## 2023-08-16 NOTE — TELEPHONE ENCOUNTER
[facility]: [facility]    Received call from [facility]; Patient with Red Flag Complaint requesting to establish care with [facility]. Subjective: Caller states \"feeling lightheaded and dizzy when standing up\" along with chest pain. Current Symptoms:   Chest pain - Not having chest pain right now    Lightheaded  Dizziness     Onset: 3 months ago started feeling sick. On 08/15/2023 had Dr appointment and BP was 140/90. Associated Symptoms: None    Pain Severity: 5/10 Chest pain; intermittent    Temperature: Denies fever     What has been tried: Nothing    LMP:  Unknown  Pregnant: No    Recommended disposition: See in Office Today    Advised to go to ED or Urgent Care if no appointment is available. Care advice provided, patient verbalizes understanding; denies any other questions or concerns; instructed to call back for any new or worsening symptoms. Patient/Caller agrees with recommended disposition; writer provided warm transfer to [facility] for appointment scheduling    Attention Provider: Thank you for allowing me to participate in the care of your patient. The patient was connected to triage in response to information provided to the E[facility]. Please do not respond through this encounter as the response is not directed to a shared pool.     Reason for Disposition   Patient wants to be seen    Protocols used: Chest Pain-ADULT-[place]

## 2025-01-22 ENCOUNTER — TELEPHONE (OUTPATIENT)
Age: 40
End: 2025-01-22

## 2025-01-22 NOTE — TELEPHONE ENCOUNTER
Karmen from [facility] states the patient reached out to them to schedule a follow up appointment. She states they have not seen the patient in their office since 2022 and called to request her most recent OV notes and labs from [facility]. I let her know that we also have not seen the patient since 2022. I offered to reach out to the patient to find out if she has found another provider or still wishes to be seen at [facility]. Karmen states she was able to pull up a recent urinalysis for the patient from [facility] with another provider's name attached. She states she will attempt to contact the patient to find out who her PCP is.

## 2025-03-31 ENCOUNTER — TRANSCRIBE ORDERS (OUTPATIENT)
Facility: HOSPITAL | Age: 40
End: 2025-03-31

## 2025-03-31 DIAGNOSIS — R19.4 CHANGE IN BOWEL HABIT: Primary | ICD-10-CM

## 2025-03-31 DIAGNOSIS — K62.5 RECTAL BLEEDING: ICD-10-CM

## 2025-04-23 ENCOUNTER — HOSPITAL ENCOUNTER (OUTPATIENT)
Facility: HOSPITAL | Age: 40
Discharge: HOME | End: 2025-04-26
Attending: STUDENT IN AN ORGANIZED HEALTH CARE EDUCATION/TRAINING PROGRAM
Payer: COMMERCIAL

## 2025-04-23 DIAGNOSIS — R19.4 CHANGE IN BOWEL HABIT: ICD-10-CM

## 2025-04-23 DIAGNOSIS — K62.5 RECTAL BLEEDING: ICD-10-CM

## 2025-04-23 PROCEDURE — 76705 ECHO EXAM OF ABDOMEN: CPT

## 2025-06-19 ENCOUNTER — TELEPHONE (OUTPATIENT)
Age: 40
End: 2025-06-19

## 2025-06-19 NOTE — TELEPHONE ENCOUNTER
Verified pt identifiers x 2       Spoke with pt in regards to Today's visit pt was under the assumption she was seeing a female provider per the pt she has spoken with Dr Joya Campbell in regards to switching and was notified she may switch due to it has been 3 years per Romie I was told to notify pt that it was up to the providers to make that decision Pt canceled her visit today in Hopes of selecting a female provider with in office I have sincerely apologizes to pt for the misunderstanding or communication due to no documentation .Please advise

## 2025-08-20 ENCOUNTER — OFFICE VISIT (OUTPATIENT)
Age: 40
End: 2025-08-20
Payer: COMMERCIAL

## 2025-08-20 VITALS
TEMPERATURE: 98.3 F | RESPIRATION RATE: 18 BRPM | BODY MASS INDEX: 35.71 KG/M2 | HEART RATE: 99 BPM | DIASTOLIC BLOOD PRESSURE: 88 MMHG | SYSTOLIC BLOOD PRESSURE: 132 MMHG | OXYGEN SATURATION: 97 % | WEIGHT: 222.2 LBS | HEIGHT: 66 IN

## 2025-08-20 DIAGNOSIS — R39.9 LOWER URINARY TRACT SYMPTOMS (LUTS): ICD-10-CM

## 2025-08-20 DIAGNOSIS — Z00.00 ANNUAL PHYSICAL EXAM: Primary | ICD-10-CM

## 2025-08-20 DIAGNOSIS — E78.2 MIXED HYPERLIPIDEMIA: ICD-10-CM

## 2025-08-20 DIAGNOSIS — Z00.00 ANNUAL PHYSICAL EXAM: ICD-10-CM

## 2025-08-20 LAB
BILIRUBIN, URINE, POC: NEGATIVE
BLOOD URINE, POC: ABNORMAL
GLUCOSE URINE, POC: NEGATIVE
KETONES, URINE, POC: NEGATIVE
LEUKOCYTE ESTERASE, URINE, POC: ABNORMAL
NITRITE, URINE, POC: NEGATIVE
PH, URINE, POC: 5.5 (ref 4.6–8)
PROTEIN,URINE, POC: 300
SPECIFIC GRAVITY, URINE, POC: 1.03 (ref 1–1.03)
URINALYSIS CLARITY, POC: CLEAR
URINALYSIS COLOR, POC: YELLOW
UROBILINOGEN, POC: ABNORMAL

## 2025-08-20 PROCEDURE — 81003 URINALYSIS AUTO W/O SCOPE: CPT | Performed by: STUDENT IN AN ORGANIZED HEALTH CARE EDUCATION/TRAINING PROGRAM

## 2025-08-20 PROCEDURE — PBSHW AMB POC URINALYSIS DIP STICK AUTO W/O MICRO: Performed by: STUDENT IN AN ORGANIZED HEALTH CARE EDUCATION/TRAINING PROGRAM

## 2025-08-20 PROCEDURE — 99385 PREV VISIT NEW AGE 18-39: CPT | Performed by: STUDENT IN AN ORGANIZED HEALTH CARE EDUCATION/TRAINING PROGRAM

## 2025-08-20 SDOH — ECONOMIC STABILITY: FOOD INSECURITY: WITHIN THE PAST 12 MONTHS, THE FOOD YOU BOUGHT JUST DIDN'T LAST AND YOU DIDN'T HAVE MONEY TO GET MORE.: NEVER TRUE

## 2025-08-20 SDOH — ECONOMIC STABILITY: FOOD INSECURITY: WITHIN THE PAST 12 MONTHS, YOU WORRIED THAT YOUR FOOD WOULD RUN OUT BEFORE YOU GOT MONEY TO BUY MORE.: NEVER TRUE

## 2025-08-20 ASSESSMENT — PATIENT HEALTH QUESTIONNAIRE - PHQ9
2. FEELING DOWN, DEPRESSED OR HOPELESS: NOT AT ALL
SUM OF ALL RESPONSES TO PHQ QUESTIONS 1-9: 0
1. LITTLE INTEREST OR PLEASURE IN DOING THINGS: NOT AT ALL
SUM OF ALL RESPONSES TO PHQ QUESTIONS 1-9: 0

## 2025-08-20 ASSESSMENT — ENCOUNTER SYMPTOMS
CONSTIPATION: 0
WHEEZING: 0
DIARRHEA: 0
EYE REDNESS: 0
ABDOMINAL PAIN: 0
VOMITING: 0
SHORTNESS OF BREATH: 0
COUGH: 0
SINUS PAIN: 0
NAUSEA: 0

## 2025-08-21 LAB
CHOLEST SERPL-MCNC: 255 MG/DL (ref 0–200)
EST. AVERAGE GLUCOSE BLD GHB EST-MCNC: 108 MG/DL
HBA1C MFR BLD: 5.4 % (ref 4–5.6)
HDLC SERPL-MCNC: 49 MG/DL (ref 40–60)
HDLC SERPL: 5.2 (ref 0–5)
LDLC SERPL CALC-MCNC: 167 MG/DL (ref 0–100)
T4 FREE SERPL-MCNC: 1 NG/DL (ref 0.9–1.6)
TRIGL SERPL-MCNC: 198 MG/DL (ref 0–150)
TSH, 3RD GENERATION: 1.37 UIU/ML (ref 0.27–4.2)
VLDLC SERPL CALC-MCNC: 40 MG/DL